# Patient Record
Sex: MALE | Race: ASIAN | NOT HISPANIC OR LATINO | URBAN - METROPOLITAN AREA
[De-identification: names, ages, dates, MRNs, and addresses within clinical notes are randomized per-mention and may not be internally consistent; named-entity substitution may affect disease eponyms.]

---

## 2022-04-25 VITALS
HEART RATE: 68 BPM | SYSTOLIC BLOOD PRESSURE: 127 MMHG | RESPIRATION RATE: 16 BRPM | DIASTOLIC BLOOD PRESSURE: 61 MMHG | OXYGEN SATURATION: 98 %

## 2022-04-25 RX ORDER — CHLORHEXIDINE GLUCONATE 213 G/1000ML
1 SOLUTION TOPICAL ONCE
Refills: 0 | Status: DISCONTINUED | OUTPATIENT
Start: 2022-05-02 | End: 2022-05-02

## 2022-04-25 NOTE — H&P ADULT - HISTORY OF PRESENT ILLNESS
Cardiologist: Dr. Nadege Samuels  Escort:  Pharmacy: [facility] #[id number] [phone]  COVID:     *Verify Meds*    70 yo M with a PMH of HTN, HLD, SVT (on NST), CAD s/p NAVA RCA/pLAD, SSS (no PPM, had chronotropic response), varicose veins s/p EVAT of R GSV who presented to outpatient cardiologist Dr. Samuels with complaints of an intermittent substernal non-radiating chest tightness on exertion of <2 blocks, relieved with rest, worsening over the past few months w/ associated b/l LE pain/discoloration on exertion/rest. Denies SOB, dizziness, diaphoresis, palpitations, orthopnea/PND, BRBPR, hematuria, melena, LE swelling, recent travel/sick contacts/illness. Pt with frequent 2.7 second pauses and PVCs on outpatient Holter monitor. Echocardiogram 8/2/21: LVEF 66%, no significant valvular disease. NST (per MD note): small to moderate ischemia in the anteroseptal locations with short episode of SVT. In light of patient's risk factors, CCS angina class ___ sx and abnormal NST, patient is referred for cardiac catheterization with possible intervention if clinically indicated.  Cardiologist: Dr. Nadege Samuels  Escort: son   Pharmacy: [facility] #[id number] [phone]  COVID: 4/29 neg (patient chart )        70 yo M with a PMH of HTN, HLD, SVT (on NST), CAD s/p NAVA RCA/pLAD, SSS (no PPM, had chronotropic response), varicose veins s/p EVAT of R GSV who presented to outpatient cardiologist Dr. Samuels with complaints of an intermittent substernal non-radiating chest tightness on exertion of <2 blocks, relieved with rest, worsening over the past few months w/ associated b/l LE pain/discoloration on exertion/rest. Denies SOB, dizziness, diaphoresis, palpitations, orthopnea/PND, BRBPR, hematuria, melena, LE swelling, recent travel/sick contacts/illness. Pt with frequent 2.7 second pauses and PVCs on outpatient Holter monitor. Echocardiogram 8/2/21: LVEF 66%, no significant valvular disease. NST (per MD note): small to moderate ischemia in the anteroseptal locations with short episode of SVT. In light of patient's risk factors, CCS angina class 3 sx and abnormal NST, patient is referred for cardiac catheterization with possible intervention if clinically indicated.  Cardiologist: Dr. Nadege Samuels  Escort: son   Pharmacy: [facility] #[id number] [phone]  COVID: 4/29 neg (patient chart )        72 yo M with a PMH of HTN, HLD, SVT (on NST), CAD s/p NAVA RCA/pLAD, SSS (no PPM, had chronotropic response), multiple myleoma, varicose veins s/p EVAT of R GSV who presented to outpatient cardiologist Dr. Samuels with complaints of an intermittent substernal non-radiating chest tightness on exertion of <2 blocks, relieved with rest, worsening over the past few months w/ associated b/l LE pain/discoloration on exertion/rest. Denies SOB, dizziness, diaphoresis, palpitations, orthopnea/PND, BRBPR, hematuria, melena, LE swelling, recent travel/sick contacts/illness. Pt with frequent 2.7 second pauses and PVCs on outpatient Holter monitor. Echocardiogram 8/2/21: LVEF 66%, no significant valvular disease. NST (per MD note): small to moderate ischemia in the anteroseptal locations with short episode of SVT. In light of patient's risk factors, CCS angina class 3 sx and abnormal NST, patient is referred for cardiac catheterization with possible intervention if clinically indicated.

## 2022-04-25 NOTE — H&P ADULT - MS EXT PE MLT D E PC
The patient is a 72y Male complaining of abnormal lab result. no clubbing/no cyanosis/no pedal edema

## 2022-04-25 NOTE — H&P ADULT - NSICDXPASTMEDICALHX_GEN_ALL_CORE_FT
PAST MEDICAL HISTORY:  CAD (coronary artery disease)     H/O varicose veins     HLD (hyperlipidemia)     HTN (hypertension)     SSS (sick sinus syndrome)

## 2022-04-25 NOTE — H&P ADULT - NSHPLABSRESULTS_GEN_ALL_CORE
13.4   8.53  )-----------( 211      ( 02 May 2022 13:33 )             38.6   05-02    x   |  x   |  19  ----------------------------<  112<H>  x    |  x   |  1.08    Ca    9.0      02 May 2022 13:34    PT/INR - ( 02 May 2022 13:34 )   PT: 10.7 sec;   INR: 0.90          PTT - ( 02 May 2022 13:34 )  PTT:26.2 sec

## 2022-04-25 NOTE — H&P ADULT - ASSESSMENT
70 yo M with a PMH of HTN, HLD, SVT (on NST), CAD s/p NAVA RCA/pLAD, SSS (no PPM, had chronotropic response), varicose veins s/p EVAT of R GSV who is referred for cardiac catheterization with possible intervention if clinically indicated 2/2 patient's risk factors, CCS angina class 3 sx and abnormal NST      H/H    . Pt denies bleeding, GI bleeding, hematemesis, hematuria, BRBPR or melena . ASA … and Plavix … pre-cath.  Cr.     IV NS@ cc/hr pre-cath.    Risks & benefits of procedure and alternative therapy have been explained to the patient including but not limited to: allergic reaction, bleeding w/possible need for blood transfusion, infection, renal and vascular compromise, limb damage, arrhythmia, stroke, vessel dissection/perforation, Myocardial infarction, emergent CABG. Informed consent obtained and in chart   70 yo M with a PMH of HTN, HLD, SVT (on NST), CAD s/p NAVA RCA/pLAD, SSS (no PPM, had chronotropic response), multiple myleoma, varicose veins s/p EVAT of R GSV who is referred for cardiac catheterization with possible intervention if clinically indicated 2/2 patient's risk factors, CCS angina class 3 sx and abnormal NST      H/H   13/38  . Pt denies bleeding, GI bleeding, hematemesis, hematuria, BRBPR or melena . Pt. loaded with  mg PO X 1 and Plavix 600 mg PO X 1 pre-cath.  Cr.     IV NS@ 250 cc bolus X 1 to be c/w NS @ 75 cc/hr pre-cath as per hydration protocol.    Risks & benefits of procedure and alternative therapy have been explained to the patient including but not limited to: allergic reaction, bleeding w/possible need for blood transfusion, infection, renal and vascular compromise, limb damage, arrhythmia, stroke, vessel dissection/perforation, Myocardial infarction, emergent CABG. Informed consent obtained and in chart

## 2022-05-02 ENCOUNTER — OUTPATIENT (OUTPATIENT)
Dept: OUTPATIENT SERVICES | Facility: HOSPITAL | Age: 72
LOS: 1 days | Discharge: HOME | End: 2022-05-02
Payer: MEDICARE

## 2022-05-02 LAB
A1C WITH ESTIMATED AVERAGE GLUCOSE RESULT: 5.9 % — HIGH (ref 4–5.6)
ALBUMIN SERPL ELPH-MCNC: 4.3 G/DL — SIGNIFICANT CHANGE UP (ref 3.3–5)
ALP SERPL-CCNC: 85 U/L — SIGNIFICANT CHANGE UP (ref 40–120)
ALT FLD-CCNC: 22 U/L — SIGNIFICANT CHANGE UP (ref 10–45)
ANION GAP SERPL CALC-SCNC: 12 MMOL/L — SIGNIFICANT CHANGE UP (ref 5–17)
ANION GAP SERPL CALC-SCNC: 17 MMOL/L — SIGNIFICANT CHANGE UP (ref 5–17)
APTT BLD: 26.2 SEC — LOW (ref 27.5–35.5)
AST SERPL-CCNC: 16 U/L — SIGNIFICANT CHANGE UP (ref 10–40)
BASOPHILS # BLD AUTO: 0.01 K/UL — SIGNIFICANT CHANGE UP (ref 0–0.2)
BASOPHILS NFR BLD AUTO: 0.1 % — SIGNIFICANT CHANGE UP (ref 0–2)
BILIRUB SERPL-MCNC: 0.9 MG/DL — SIGNIFICANT CHANGE UP (ref 0.2–1.2)
BUN SERPL-MCNC: 16 MG/DL — SIGNIFICANT CHANGE UP (ref 7–23)
BUN SERPL-MCNC: 19 MG/DL — SIGNIFICANT CHANGE UP (ref 7–23)
CALCIUM SERPL-MCNC: 8.1 MG/DL — LOW (ref 8.4–10.5)
CALCIUM SERPL-MCNC: 9 MG/DL — SIGNIFICANT CHANGE UP (ref 8.4–10.5)
CHLORIDE SERPL-SCNC: 108 MMOL/L — SIGNIFICANT CHANGE UP (ref 96–108)
CHLORIDE SERPL-SCNC: 110 MMOL/L — HIGH (ref 96–108)
CHOLEST SERPL-MCNC: 126 MG/DL — SIGNIFICANT CHANGE UP
CK MB CFR SERPL CALC: 3.8 NG/ML — SIGNIFICANT CHANGE UP (ref 0–6.7)
CK SERPL-CCNC: 75 U/L — SIGNIFICANT CHANGE UP (ref 30–200)
CO2 SERPL-SCNC: 21 MMOL/L — LOW (ref 22–31)
CO2 SERPL-SCNC: 22 MMOL/L — SIGNIFICANT CHANGE UP (ref 22–31)
CREAT SERPL-MCNC: 1.08 MG/DL — SIGNIFICANT CHANGE UP (ref 0.5–1.3)
CREAT SERPL-MCNC: 1.15 MG/DL — SIGNIFICANT CHANGE UP (ref 0.5–1.3)
EGFR: 68 ML/MIN/1.73M2 — SIGNIFICANT CHANGE UP
EGFR: 73 ML/MIN/1.73M2 — SIGNIFICANT CHANGE UP
EOSINOPHIL # BLD AUTO: 0 K/UL — SIGNIFICANT CHANGE UP (ref 0–0.5)
EOSINOPHIL NFR BLD AUTO: 0 % — SIGNIFICANT CHANGE UP (ref 0–6)
ESTIMATED AVERAGE GLUCOSE: 123 MG/DL — HIGH (ref 68–114)
GLUCOSE SERPL-MCNC: 112 MG/DL — HIGH (ref 70–99)
GLUCOSE SERPL-MCNC: 199 MG/DL — HIGH (ref 70–99)
HCT VFR BLD CALC: 33.2 % — LOW (ref 39–50)
HCT VFR BLD CALC: 38.6 % — LOW (ref 39–50)
HCV AB S/CO SERPL IA: 0.04 S/CO — SIGNIFICANT CHANGE UP
HCV AB SERPL-IMP: SIGNIFICANT CHANGE UP
HDLC SERPL-MCNC: 38 MG/DL — LOW
HGB BLD-MCNC: 11.6 G/DL — LOW (ref 13–17)
HGB BLD-MCNC: 13.4 G/DL — SIGNIFICANT CHANGE UP (ref 13–17)
IMM GRANULOCYTES NFR BLD AUTO: 0.5 % — SIGNIFICANT CHANGE UP (ref 0–1.5)
INR BLD: 0.9 — SIGNIFICANT CHANGE UP (ref 0.88–1.16)
LIPID PNL WITH DIRECT LDL SERPL: 71 MG/DL — SIGNIFICANT CHANGE UP
LYMPHOCYTES # BLD AUTO: 1.9 K/UL — SIGNIFICANT CHANGE UP (ref 1–3.3)
LYMPHOCYTES # BLD AUTO: 22.3 % — SIGNIFICANT CHANGE UP (ref 13–44)
MCHC RBC-ENTMCNC: 30.9 PG — SIGNIFICANT CHANGE UP (ref 27–34)
MCHC RBC-ENTMCNC: 31 PG — SIGNIFICANT CHANGE UP (ref 27–34)
MCHC RBC-ENTMCNC: 34.7 GM/DL — SIGNIFICANT CHANGE UP (ref 32–36)
MCHC RBC-ENTMCNC: 34.9 GM/DL — SIGNIFICANT CHANGE UP (ref 32–36)
MCV RBC AUTO: 88.3 FL — SIGNIFICANT CHANGE UP (ref 80–100)
MCV RBC AUTO: 89.4 FL — SIGNIFICANT CHANGE UP (ref 80–100)
MONOCYTES # BLD AUTO: 0.7 K/UL — SIGNIFICANT CHANGE UP (ref 0–0.9)
MONOCYTES NFR BLD AUTO: 8.2 % — SIGNIFICANT CHANGE UP (ref 2–14)
NEUTROPHILS # BLD AUTO: 5.88 K/UL — SIGNIFICANT CHANGE UP (ref 1.8–7.4)
NEUTROPHILS NFR BLD AUTO: 68.9 % — SIGNIFICANT CHANGE UP (ref 43–77)
NON HDL CHOLESTEROL: 88 MG/DL — SIGNIFICANT CHANGE UP
NRBC # BLD: 0 /100 WBCS — SIGNIFICANT CHANGE UP (ref 0–0)
NRBC # BLD: 0 /100 WBCS — SIGNIFICANT CHANGE UP (ref 0–0)
PLATELET # BLD AUTO: 174 K/UL — SIGNIFICANT CHANGE UP (ref 150–400)
PLATELET # BLD AUTO: 211 K/UL — SIGNIFICANT CHANGE UP (ref 150–400)
POTASSIUM SERPL-MCNC: 3.5 MMOL/L — SIGNIFICANT CHANGE UP (ref 3.5–5.3)
POTASSIUM SERPL-MCNC: 4 MMOL/L — SIGNIFICANT CHANGE UP (ref 3.5–5.3)
POTASSIUM SERPL-SCNC: 3.5 MMOL/L — SIGNIFICANT CHANGE UP (ref 3.5–5.3)
POTASSIUM SERPL-SCNC: 4 MMOL/L — SIGNIFICANT CHANGE UP (ref 3.5–5.3)
PROT SERPL-MCNC: 6.4 G/DL — SIGNIFICANT CHANGE UP (ref 6–8.3)
PROTHROM AB SERPL-ACNC: 10.7 SEC — SIGNIFICANT CHANGE UP (ref 10.5–13.4)
RBC # BLD: 3.76 M/UL — LOW (ref 4.2–5.8)
RBC # BLD: 4.32 M/UL — SIGNIFICANT CHANGE UP (ref 4.2–5.8)
RBC # FLD: 15.4 % — HIGH (ref 10.3–14.5)
RBC # FLD: 15.4 % — HIGH (ref 10.3–14.5)
SODIUM SERPL-SCNC: 144 MMOL/L — SIGNIFICANT CHANGE UP (ref 135–145)
SODIUM SERPL-SCNC: 146 MMOL/L — HIGH (ref 135–145)
TRIGL SERPL-MCNC: 83 MG/DL — SIGNIFICANT CHANGE UP
WBC # BLD: 5.94 K/UL — SIGNIFICANT CHANGE UP (ref 3.8–10.5)
WBC # BLD: 8.53 K/UL — SIGNIFICANT CHANGE UP (ref 3.8–10.5)
WBC # FLD AUTO: 5.94 K/UL — SIGNIFICANT CHANGE UP (ref 3.8–10.5)
WBC # FLD AUTO: 8.53 K/UL — SIGNIFICANT CHANGE UP (ref 3.8–10.5)

## 2022-05-02 PROCEDURE — 99152 MOD SED SAME PHYS/QHP 5/>YRS: CPT

## 2022-05-02 PROCEDURE — 80053 COMPREHEN METABOLIC PANEL: CPT

## 2022-05-02 PROCEDURE — 82550 ASSAY OF CK (CPK): CPT

## 2022-05-02 PROCEDURE — 82553 CREATINE MB FRACTION: CPT

## 2022-05-02 PROCEDURE — 99153 MOD SED SAME PHYS/QHP EA: CPT

## 2022-05-02 PROCEDURE — 86803 HEPATITIS C AB TEST: CPT

## 2022-05-02 PROCEDURE — 93005 ELECTROCARDIOGRAM TRACING: CPT

## 2022-05-02 PROCEDURE — 83036 HEMOGLOBIN GLYCOSYLATED A1C: CPT

## 2022-05-02 PROCEDURE — 92978 ENDOLUMINL IVUS OCT C 1ST: CPT | Mod: 26,LD

## 2022-05-02 PROCEDURE — C1725: CPT

## 2022-05-02 PROCEDURE — C1874: CPT

## 2022-05-02 PROCEDURE — C1894: CPT

## 2022-05-02 PROCEDURE — C1887: CPT

## 2022-05-02 PROCEDURE — 80048 BASIC METABOLIC PNL TOTAL CA: CPT

## 2022-05-02 PROCEDURE — 93010 ELECTROCARDIOGRAM REPORT: CPT

## 2022-05-02 PROCEDURE — C1753: CPT

## 2022-05-02 PROCEDURE — C1769: CPT

## 2022-05-02 PROCEDURE — 92928 PRQ TCAT PLMT NTRAC ST 1 LES: CPT | Mod: LD

## 2022-05-02 PROCEDURE — 85025 COMPLETE CBC W/AUTO DIFF WBC: CPT

## 2022-05-02 PROCEDURE — C9600: CPT | Mod: LD

## 2022-05-02 PROCEDURE — 85027 COMPLETE CBC AUTOMATED: CPT

## 2022-05-02 PROCEDURE — 85730 THROMBOPLASTIN TIME PARTIAL: CPT

## 2022-05-02 PROCEDURE — 80061 LIPID PANEL: CPT

## 2022-05-02 PROCEDURE — 85610 PROTHROMBIN TIME: CPT

## 2022-05-02 PROCEDURE — 93458 L HRT ARTERY/VENTRICLE ANGIO: CPT | Mod: 26,59

## 2022-05-02 PROCEDURE — 93458 L HRT ARTERY/VENTRICLE ANGIO: CPT | Mod: 59

## 2022-05-02 PROCEDURE — 92978 ENDOLUMINL IVUS OCT C 1ST: CPT | Mod: LD

## 2022-05-02 RX ORDER — SODIUM CHLORIDE 9 MG/ML
1000 INJECTION INTRAMUSCULAR; INTRAVENOUS; SUBCUTANEOUS
Refills: 0 | Status: DISCONTINUED | OUTPATIENT
Start: 2022-05-02 | End: 2022-05-17

## 2022-05-02 RX ORDER — CLOPIDOGREL BISULFATE 75 MG/1
1 TABLET, FILM COATED ORAL
Qty: 30 | Refills: 11
Start: 2022-05-02 | End: 2023-04-26

## 2022-05-02 RX ORDER — ASPIRIN/CALCIUM CARB/MAGNESIUM 324 MG
1 TABLET ORAL
Qty: 30 | Refills: 11
Start: 2022-05-02 | End: 2023-04-26

## 2022-05-02 RX ORDER — OLMESARTAN MEDOXOMIL 5 MG/1
1 TABLET, FILM COATED ORAL
Qty: 0 | Refills: 0 | DISCHARGE

## 2022-05-02 RX ORDER — CLOPIDOGREL BISULFATE 75 MG/1
600 TABLET, FILM COATED ORAL ONCE
Refills: 0 | Status: COMPLETED | OUTPATIENT
Start: 2022-05-02 | End: 2022-05-02

## 2022-05-02 RX ORDER — ASPIRIN/CALCIUM CARB/MAGNESIUM 324 MG
1 TABLET ORAL
Qty: 0 | Refills: 0 | DISCHARGE

## 2022-05-02 RX ORDER — ASPIRIN/CALCIUM CARB/MAGNESIUM 324 MG
243 TABLET ORAL ONCE
Refills: 0 | Status: COMPLETED | OUTPATIENT
Start: 2022-05-02 | End: 2022-05-02

## 2022-05-02 RX ORDER — SODIUM CHLORIDE 9 MG/ML
250 INJECTION INTRAMUSCULAR; INTRAVENOUS; SUBCUTANEOUS ONCE
Refills: 0 | Status: COMPLETED | OUTPATIENT
Start: 2022-05-02 | End: 2022-05-02

## 2022-05-02 RX ORDER — POTASSIUM CHLORIDE 20 MEQ
40 PACKET (EA) ORAL ONCE
Refills: 0 | Status: COMPLETED | OUTPATIENT
Start: 2022-05-02 | End: 2022-05-02

## 2022-05-02 RX ORDER — SODIUM CHLORIDE 9 MG/ML
500 INJECTION INTRAMUSCULAR; INTRAVENOUS; SUBCUTANEOUS
Refills: 0 | Status: DISCONTINUED | OUTPATIENT
Start: 2022-05-02 | End: 2022-05-02

## 2022-05-02 RX ADMIN — SODIUM CHLORIDE 75 MILLILITER(S): 9 INJECTION INTRAMUSCULAR; INTRAVENOUS; SUBCUTANEOUS at 14:10

## 2022-05-02 RX ADMIN — SODIUM CHLORIDE 205 MILLILITER(S): 9 INJECTION INTRAMUSCULAR; INTRAVENOUS; SUBCUTANEOUS at 17:10

## 2022-05-02 RX ADMIN — Medication 243 MILLIGRAM(S): at 14:25

## 2022-05-02 RX ADMIN — Medication 40 MILLIEQUIVALENT(S): at 14:26

## 2022-05-02 RX ADMIN — SODIUM CHLORIDE 500 MILLILITER(S): 9 INJECTION INTRAMUSCULAR; INTRAVENOUS; SUBCUTANEOUS at 14:10

## 2022-05-02 RX ADMIN — CLOPIDOGREL BISULFATE 600 MILLIGRAM(S): 75 TABLET, FILM COATED ORAL at 14:25

## 2022-05-02 NOTE — PROGRESS NOTE ADULT - SUBJECTIVE AND OBJECTIVE BOX
Interventional Cardiology PA Post PCI SDA Discharge Note      Patient without complaints. Ambulated and voided without difficulties    Ext: Right Radial: no hematoma, no bleeding, dressing; C/D/I          Pulses: intact RAD/DP/PT to baseline     A/P: 70 yo M with a PMH of HTN, HLD, SVT (on NST), CAD s/p NAVA RCA/pLAD, SSS (no PPM, had chronotropic response), multiple myleoma, varicose veins s/p EVAT of R GSV who is referred for cardiac catheterization with possible intervention if clinically indicated 2/2 patient's risk factors, CCS angina class 3 sx and abnormal NST.  Pt now s/p cardiac cath 5/2/22: NAVA x 2 pLAD, access R radial TR band applied.    1. Follow-up with PMD/Cardiologist Abril in 72 hours.  2. Post procedure labs/EKG reviewed and stable.    3. Pt given instructions on importance of taking antiplatelet medication.    4. Stable for discharge as per attending Dr. Vogt after bed rest, pt voids, wrist stable and 30 minutes of ambulation.  5. Prescriptions for Aspirin and Plavix e-prescribed and submitted to patient's pharmacy.  6. Patient will continue Ranexa 500mg BID, Amlodipine/Atorvastatin 10mg/10mg QD, Olmesartan 40mg QD, Omeprazole 20mg QD.   Interventional Cardiology PA Post PCI SDA Discharge Note      Patient without complaints. Ambulated and voided without difficulties    Ext: Right Radial: no hematoma, no bleeding, dressing; C/D/I          Pulses: intact RAD/DP/PT to baseline     A/P: 72 yo M with a PMH of HTN, HLD, SVT (on NST), CAD s/p NAVA RCA/pLAD, SSS (no PPM, had chronotropic response), multiple myleoma, varicose veins s/p EVAT of R GSV who is referred for cardiac catheterization with possible intervention if clinically indicated 2/2 patient's risk factors, CCS angina class 3 sx and abnormal NST.   Pt now s/p cardiac cath 5/2/22: Shockwave/NAVA x 2 pLAD (75% ISR Ca+); LM normal, dLCx 30-40%, pRCA 20-30%, dRCA stent patent, RPDA stent patent, oRPDA 50%, EDP 12, access R radial TR band applied.    1. Follow-up with PMD/Cardiologist Abril in 72 hours.  2. Post procedure labs/EKG reviewed and stable.    3. Pt given instructions on importance of taking antiplatelet medication.    4. Stable for discharge as per attending Dr. Vogt after bed rest, pt voids, wrist stable and 30 minutes of ambulation.  5. Prescriptions for Aspirin and Plavix e-prescribed and submitted to patient's pharmacy.  6. Patient will continue Ranexa 500mg BID, Amlodipine/Atorvastatin 10mg/10mg QD, Olmesartan 40mg QD, Omeprazole 20mg QD.

## 2022-05-10 DIAGNOSIS — T82.855A STENOSIS OF CORONARY ARTERY STENT, INITIAL ENCOUNTER: ICD-10-CM

## 2022-05-10 DIAGNOSIS — I25.10 ATHEROSCLEROTIC HEART DISEASE OF NATIVE CORONARY ARTERY WITHOUT ANGINA PECTORIS: ICD-10-CM

## 2022-05-10 DIAGNOSIS — I25.84 CORONARY ATHEROSCLEROSIS DUE TO CALCIFIED CORONARY LESION: ICD-10-CM

## 2022-05-10 DIAGNOSIS — R94.39 ABNORMAL RESULT OF OTHER CARDIOVASCULAR FUNCTION STUDY: ICD-10-CM

## 2023-05-24 PROBLEM — I25.10 ATHEROSCLEROTIC HEART DISEASE OF NATIVE CORONARY ARTERY WITHOUT ANGINA PECTORIS: Chronic | Status: ACTIVE | Noted: 2022-04-25

## 2023-05-24 PROBLEM — Z86.79 PERSONAL HISTORY OF OTHER DISEASES OF THE CIRCULATORY SYSTEM: Chronic | Status: ACTIVE | Noted: 2022-04-25

## 2023-05-24 PROBLEM — I49.5 SICK SINUS SYNDROME: Chronic | Status: ACTIVE | Noted: 2022-04-25

## 2023-05-24 PROBLEM — E78.5 HYPERLIPIDEMIA, UNSPECIFIED: Chronic | Status: ACTIVE | Noted: 2022-04-25

## 2023-05-24 PROBLEM — I10 ESSENTIAL (PRIMARY) HYPERTENSION: Chronic | Status: ACTIVE | Noted: 2022-04-25

## 2023-05-30 VITALS
OXYGEN SATURATION: 98 % | TEMPERATURE: 98 F | RESPIRATION RATE: 16 BRPM | WEIGHT: 154.98 LBS | DIASTOLIC BLOOD PRESSURE: 71 MMHG | HEART RATE: 49 BPM | SYSTOLIC BLOOD PRESSURE: 131 MMHG | HEIGHT: 64 IN

## 2023-05-30 RX ORDER — CHLORHEXIDINE GLUCONATE 213 G/1000ML
1 SOLUTION TOPICAL ONCE
Refills: 0 | Status: DISCONTINUED | OUTPATIENT
Start: 2023-06-05 | End: 2023-06-20

## 2023-05-30 RX ORDER — AMLODIPINE AND ATORVASTATIN 5; 20 MG/1; MG/1
1 TABLET, FILM COATED ORAL
Qty: 0 | Refills: 0 | DISCHARGE

## 2023-05-30 RX ORDER — ASPIRIN/CALCIUM CARB/MAGNESIUM 324 MG
0 TABLET ORAL
Qty: 0 | Refills: 0 | DISCHARGE

## 2023-05-30 RX ORDER — PREGABALIN 225 MG/1
0 CAPSULE ORAL
Qty: 0 | Refills: 0 | DISCHARGE

## 2023-05-30 RX ORDER — DEXAMETHASONE 0.5 MG/5ML
0 ELIXIR ORAL
Qty: 0 | Refills: 0 | DISCHARGE

## 2023-05-30 RX ORDER — LENALIDOMIDE 5 MG/1
1 CAPSULE ORAL
Qty: 0 | Refills: 0 | DISCHARGE

## 2023-05-30 RX ORDER — RANOLAZINE 500 MG/1
1 TABLET, FILM COATED, EXTENDED RELEASE ORAL
Qty: 0 | Refills: 0 | DISCHARGE

## 2023-05-30 RX ORDER — CHONDROITIN SULFATE A SODIUM 100 %
1200 POWDER (GRAM) MISCELLANEOUS
Qty: 0 | Refills: 0 | DISCHARGE

## 2023-05-30 RX ORDER — NITROGLYCERIN 6.5 MG
1 CAPSULE, EXTENDED RELEASE ORAL
Qty: 0 | Refills: 0 | DISCHARGE

## 2023-05-30 RX ORDER — ASCORBIC ACID 60 MG
1 TABLET,CHEWABLE ORAL
Qty: 0 | Refills: 0 | DISCHARGE

## 2023-05-30 NOTE — H&P ADULT - NSICDXPASTMEDICALHX_GEN_ALL_CORE_FT
PAST MEDICAL HISTORY:  CAD (coronary artery disease)     H/O varicose veins     HLD (hyperlipidemia)     HTN (hypertension)     Multiple myeloma     SSS (sick sinus syndrome)

## 2023-05-30 NOTE — H&P ADULT - HISTORY OF PRESENT ILLNESS
Cardiologist - Dr. Nadege Samuels  Pharmacy -  [facility] -    72M, __ speaking, w/ PMHx of HTN, HLD, CAD s/p multiple PCIs (most recently 5/2022 w/ NAVA x 2 pLAD and patent dRCA and RPDA stents), h/o SSS w/ chronotropic responce (no PPM), multiple myeloma (____), varicose veins s/p EVAT of R GSV and _______, initially presented to outpt cardiologist Dr. Samuels c/o       Cardiac Cath 5/2/22: Shockwave/NAVA x 2 pLAD (75% ISR); LM normal, dLCx 30-40%, pRCA 20-30%, dRCA stent patent, RPDA stent patent, oRPDA 50%, EDP 12, access R radial. Cardiologist - Dr. Nadege Samuels  Pharmacy - [facility] ([phone])  Escort -    72M, __ speaking, w/ PMHx of HTN, HLD, CAD s/p multiple PCIs (most recently 5/2022 w/ NAVA x 2 pLAD and patent dRCA and RPDA stents), h/o SSS w/ chronotropic response (no PPM), multiple myeloma (____), and varicose veins s/p EVAT of R GSV, initially presented to outpt cardiologist Dr. Samuels c/o persistent TOM despite stopping __ x 2months (confirm which oncology med).Pt currently walking 3mph for 30min but w/ significant change from prior. He also endorses shoulder discomfort. __. Pt denies any ___ CP, palpitations, dizziness, syncope, diaphoresis, fatigue, LE edema, orthopnea, PND, N/V, fever, chills or recent sick contact.     Cardiac Cath 5/2/22: Shockwave/NAVA x 2 pLAD (75% ISR); LM normal, dLCx 30-40%, pRCA 20-30%, dRCA stent patent, RPDA stent patent, oRPDA 50%, EDP 12, access R radial. Cardiologist - Dr. Nadege Samuels  Pharmacy - [facility] ([phone])  Escort -    72M, __ speaking, w/ PMHx of HTN, HLD, CAD s/p multiple PCIs (most recently 5/2022 w/ NAVA x 2 pLAD and patent dRCA and RPDA stents), h/o SSS w/ chronotropic response (no PPM), Multiple Myeloma (on Ninlaro), and varicose veins s/p EVAT of R GSV, initially presented to outpt cardiologist Dr. Samuels c/o persistent TOM despite stopping Ninlaro x 2months, as well as trialing Lasix x 3 days w/o improvement. Pt currently walking 3mph for 30min but w/ significant change from prior. He also endorses intermittent, mild substernal CP, described as dull ache, that occurs w/ mod exertion, lasting a few months and improving w/ rest. Pt denies any ___ palpitations, dizziness, syncope, diaphoresis, fatigue, LE edema, orthopnea, PND, N/V, fever, chills or recent sick contact.     NST 3/26/23: small-mod ischemia in anterior region which was reversible w/ rest, LVEF 53% w/o WMA. TTE 3/13/23: LVEF 64% w/o WMA, mildly dilated LA, G1DD, mild MR/TR, mild PHTN (PASP 42mmHg). Cardiac Cath 5/2/22: Shockwave/NAVA x 2 pLAD (75% ISR); LM normal, dLCx 30-40%, pRCA 20-30%, dRCA stent patent, RPDA stent patent, oRPDA 50%, EDP 12, access R radial. Cardiologist - Dr. Nadege Samuels  Pharmacy - [facility] ([phone])  Escort -    72M, __ speaking, w/ PMHx of HTN, HLD, CAD s/p multiple PCIs (most recently 5/2022 w/ NAVA x 2 pLAD and patent dRCA and RPDA stents), h/o SSS w/ chronotropic response (no PPM), Multiple Myeloma (on Ninlaro), and varicose veins s/p EVAT of R GSV, initially presented to outpt cardiologist Dr. Samuels c/o persistent TOM despite stopping Ninlaro x 2months, as well as trialing Lasix x 3 days w/o improvement. Pt currently walking 3mph for 30min but w/ significant change from prior. He also endorses intermittent, mild substernal CP, described as dull ache, that occurs w/ mod exertion, lasting a few months and improving w/ rest. Pt denies any ___ palpitations, dizziness, syncope, diaphoresis, fatigue, LE edema, orthopnea, PND, N/V, fever, chills or recent sick contact.     NST 3/26/23: small-mod ischemia in anterior region which was reversible w/ rest, LVEF 53% w/o WMA. TTE 3/13/23: LVEF 64% w/o WMA, mildly dilated LA, G1DD, mild MR/TR, mild PHTN (PASP 42mmHg). Cardiac Cath 5/2/22: Shockwave/NAVA x 2 pLAD (75% ISR); LM normal, dLCx 30-40%, pRCA 20-30%, dRCA stent patent, RPDA stent patent, oRPDA 50%, EDP 12, access R radial.    In light of pts risk factors, CCS class III anginal symptoms and abnormal NST, pt now presents to [facility] for recommended cardiac catheterization with possible intervention if clinically indicated.  Cardiologist - Dr. Nadege Samuels  Pharmacy - [facility] [phone]  Escort - Son  Language Line Irish #[id number]     72M Irish speaking PMHx of HTN, HLD, CAD s/p multiple PCIs (most recently 5/2022 w/ NAVA x 2 pLAD and patent dRCA and RPDA stents), h/o SSS w/ chronotropic response (no PPM), Multiple Myeloma (previously on Ninlaro/Pomalyst), and varicose veins s/p EVAT of R GSV, initially presented to outpt cardiologist Dr. Samuels c/o persistent TOM despite stopping Ninlaro/Pomalyst   x 1 month. Patient has trialed Lasix w/o improvement . Pt currently walking 3mph for 30min but w/ significant change from prior. He also endorses intermittent, mild substernal CP, described as dull ache, that occurs w/ mod exertion, lasting a few months and improving w/ rest. Pt denies any  palpitations, dizziness, syncope, diaphoresis, fatigue, LE edema, orthopnea, PND, N/V, fever, chills or recent sick contact.     NST 3/26/23: small-mod ischemia in anterior region which was reversible w/ rest, LVEF 53% w/o WMA. TTE 3/13/23: LVEF 64% w/o WMA, mildly dilated LA, G1DD, mild MR/TR, mild PHTN (PASP 42mmHg). Cardiac Cath 5/2/22: Shockwave/NAVA x 2 pLAD (75% ISR); LM normal, dLCx 30-40%, pRCA 20-30%, dRCA stent patent, RPDA stent patent, oRPDA 50%, EDP 12, access R radial.    In light of pts risk factors, CCS class III anginal symptoms and abnormal NST, pt now presents to [facility] for recommended cardiac catheterization with possible intervention if clinically indicated.

## 2023-05-30 NOTE — H&P ADULT - ASSESSMENT
72M Montserratian speaking PMHx of HTN, HLD, CAD s/p multiple PCIs (most recently 5/2022 w/ NAVA x 2 pLAD and patent dRCA and RPDA stents), h/o SSS w/ chronotropic response (no PPM), Multiple Myeloma (previously on Ninlaro/Pomalyst), and varicose veins s/p EVAT of R GSV presents to [facility] for recommended cardiac catheterization with possible intervention if clinically indicated in light of patients risk factors, CCS class III anginal symptoms, and abnormal NST       ASA III          Mallampati II    Patient is a candidate for sedation: yes  Sedation: Moderate    Risks & benefits of procedure and alternative therapy have been explained to the patient including but not limited to: allergic reaction, bleeding w/possible need for blood transfusion, infection, renal and vascular compromise, limb damage, arrhythmia, stroke, vessel dissection/perforation, Myocardial infarction, emergent CABG. Informed consent obtained and in chart.           [] H/H 13.7/39.4 Plt 187. Patient denies bleeding, BRBPR, melena, hematuria, hematemesis.  - Patient compliant with ASA/Plavix at home, took this AM. No load needed  [] BUN/Cr 11/1.15 Patient breathing comfortably on RA, lungs clear however w/ B/L 2+ pitting edema to shins   - Hydrate with NS 75 cc/hr x 2 hrs per Hydration Protocol, will defer fluid bolus at this time.   [] K 3.6 Mg 1.8   - Replete w/ KCl 40mEq and MagOx 400mg 72M Afghan speaking PMHx of HTN, HLD, CAD s/p multiple PCIs (most recently 5/2022 w/ NAVA x 2 pLAD and patent dRCA and RPDA stents), h/o SSS w/ chronotropic response (no PPM), Multiple Myeloma (previously on Ninlaro/Pomalyst), and varicose veins s/p EVAT of R GSV presents to [facility] for recommended cardiac catheterization with possible intervention if clinically indicated in light of patients risk factors, CCS class III anginal symptoms, and abnormal NST       ASA III          Mallampati II    Patient is a candidate for sedation: yes  Sedation: Moderate    Risks & benefits of procedure and alternative therapy have been explained to the patient including but not limited to: allergic reaction, bleeding w/possible need for blood transfusion, infection, renal and vascular compromise, limb damage, arrhythmia, stroke, vessel dissection/perforation, Myocardial infarction, emergent CABG. Informed consent obtained and in chart.         Consent obtained w/ Afghan  #[id number]    [] H/H 13.7/39.4 Plt 187. Patient denies bleeding, BRBPR, melena, hematuria, hematemesis.  - Patient compliant with ASA/Plavix at home, took this AM. No load needed  [] BUN/Cr 11/1.15 Patient breathing comfortably on RA, lungs clear however w/ B/L 2+ pitting edema to shins   - Hydrate with NS 75 cc/hr x 2 hrs per Hydration Protocol, will defer fluid bolus at this time.   [] K 3.6 Mg 1.8   - Replete w/ KCl 40mEq and MagOx 400mg

## 2023-05-30 NOTE — H&P ADULT - NSHPLABSRESULTS_GEN_ALL_CORE
13.7   6.17  )-----------( 187      ( 05 Jun 2023 12:44 )             39.4       06-05    142  |  105  |  11  ----------------------------<  92  3.6   |  27  |  1.15    Ca    9.1      05 Jun 2023 12:44  Mg     1.8     06-05    TPro  7.1  /  Alb  4.3  /  TBili  0.9  /  DBili  x   /  AST  23  /  ALT  14  /  AlkPhos  74  06-05      PT/INR - ( 05 Jun 2023 12:44 )   PT: 11.3 sec;   INR: 0.95          PTT - ( 05 Jun 2023 12:44 )  PTT:29.3 sec    CARDIAC MARKERS ( 05 Jun 2023 12:44 )  x     / x     / 171 U/L / x     / 2.7 ng/mL            EKG 6/5/23: 60 BPM NSR w/ PVCs no ischemic changes

## 2023-06-05 ENCOUNTER — OUTPATIENT (OUTPATIENT)
Dept: OUTPATIENT SERVICES | Facility: HOSPITAL | Age: 73
LOS: 1 days | Discharge: HOME | End: 2023-06-05
Payer: MEDICARE

## 2023-06-05 LAB
A1C WITH ESTIMATED AVERAGE GLUCOSE RESULT: 6.1 % — HIGH (ref 4–5.6)
ALBUMIN SERPL ELPH-MCNC: 4.3 G/DL — SIGNIFICANT CHANGE UP (ref 3.3–5)
ALP SERPL-CCNC: 74 U/L — SIGNIFICANT CHANGE UP (ref 40–120)
ALT FLD-CCNC: 14 U/L — SIGNIFICANT CHANGE UP (ref 10–45)
ANION GAP SERPL CALC-SCNC: 10 MMOL/L — SIGNIFICANT CHANGE UP (ref 5–17)
APTT BLD: 29.3 SEC — SIGNIFICANT CHANGE UP (ref 27.5–35.5)
AST SERPL-CCNC: 23 U/L — SIGNIFICANT CHANGE UP (ref 10–40)
BASOPHILS # BLD AUTO: 0.09 K/UL — SIGNIFICANT CHANGE UP (ref 0–0.2)
BASOPHILS NFR BLD AUTO: 1.5 % — SIGNIFICANT CHANGE UP (ref 0–2)
BILIRUB SERPL-MCNC: 0.9 MG/DL — SIGNIFICANT CHANGE UP (ref 0.2–1.2)
BUN SERPL-MCNC: 11 MG/DL — SIGNIFICANT CHANGE UP (ref 7–23)
CALCIUM SERPL-MCNC: 9.1 MG/DL — SIGNIFICANT CHANGE UP (ref 8.4–10.5)
CHLORIDE SERPL-SCNC: 105 MMOL/L — SIGNIFICANT CHANGE UP (ref 96–108)
CHOLEST SERPL-MCNC: 119 MG/DL — SIGNIFICANT CHANGE UP
CK MB CFR SERPL CALC: 2.7 NG/ML — SIGNIFICANT CHANGE UP (ref 0–6.7)
CK SERPL-CCNC: 171 U/L — SIGNIFICANT CHANGE UP (ref 30–200)
CO2 SERPL-SCNC: 27 MMOL/L — SIGNIFICANT CHANGE UP (ref 22–31)
CREAT SERPL-MCNC: 1.15 MG/DL — SIGNIFICANT CHANGE UP (ref 0.5–1.3)
EGFR: 67 ML/MIN/1.73M2 — SIGNIFICANT CHANGE UP
EOSINOPHIL # BLD AUTO: 0.21 K/UL — SIGNIFICANT CHANGE UP (ref 0–0.5)
EOSINOPHIL NFR BLD AUTO: 3.4 % — SIGNIFICANT CHANGE UP (ref 0–6)
ESTIMATED AVERAGE GLUCOSE: 128 MG/DL — HIGH (ref 68–114)
GLUCOSE SERPL-MCNC: 92 MG/DL — SIGNIFICANT CHANGE UP (ref 70–99)
HCT VFR BLD CALC: 39.4 % — SIGNIFICANT CHANGE UP (ref 39–50)
HDLC SERPL-MCNC: 29 MG/DL — LOW
HGB BLD-MCNC: 13.7 G/DL — SIGNIFICANT CHANGE UP (ref 13–17)
IMM GRANULOCYTES NFR BLD AUTO: 0.3 % — SIGNIFICANT CHANGE UP (ref 0–0.9)
INR BLD: 0.95 — SIGNIFICANT CHANGE UP (ref 0.88–1.16)
LIPID PNL WITH DIRECT LDL SERPL: 45 MG/DL — SIGNIFICANT CHANGE UP
LYMPHOCYTES # BLD AUTO: 2.75 K/UL — SIGNIFICANT CHANGE UP (ref 1–3.3)
LYMPHOCYTES # BLD AUTO: 44.6 % — HIGH (ref 13–44)
MAGNESIUM SERPL-MCNC: 1.8 MG/DL — SIGNIFICANT CHANGE UP (ref 1.6–2.6)
MCHC RBC-ENTMCNC: 30.1 PG — SIGNIFICANT CHANGE UP (ref 27–34)
MCHC RBC-ENTMCNC: 34.8 GM/DL — SIGNIFICANT CHANGE UP (ref 32–36)
MCV RBC AUTO: 86.6 FL — SIGNIFICANT CHANGE UP (ref 80–100)
MONOCYTES # BLD AUTO: 0.82 K/UL — SIGNIFICANT CHANGE UP (ref 0–0.9)
MONOCYTES NFR BLD AUTO: 13.3 % — SIGNIFICANT CHANGE UP (ref 2–14)
NEUTROPHILS # BLD AUTO: 2.28 K/UL — SIGNIFICANT CHANGE UP (ref 1.8–7.4)
NEUTROPHILS NFR BLD AUTO: 36.9 % — LOW (ref 43–77)
NON HDL CHOLESTEROL: 90 MG/DL — SIGNIFICANT CHANGE UP
NRBC # BLD: 0 /100 WBCS — SIGNIFICANT CHANGE UP (ref 0–0)
PLATELET # BLD AUTO: 187 K/UL — SIGNIFICANT CHANGE UP (ref 150–400)
POTASSIUM SERPL-MCNC: 3.6 MMOL/L — SIGNIFICANT CHANGE UP (ref 3.5–5.3)
POTASSIUM SERPL-SCNC: 3.6 MMOL/L — SIGNIFICANT CHANGE UP (ref 3.5–5.3)
PROT SERPL-MCNC: 7.1 G/DL — SIGNIFICANT CHANGE UP (ref 6–8.3)
PROTHROM AB SERPL-ACNC: 11.3 SEC — SIGNIFICANT CHANGE UP (ref 10.5–13.4)
RBC # BLD: 4.55 M/UL — SIGNIFICANT CHANGE UP (ref 4.2–5.8)
RBC # FLD: 15.5 % — HIGH (ref 10.3–14.5)
SODIUM SERPL-SCNC: 142 MMOL/L — SIGNIFICANT CHANGE UP (ref 135–145)
TRIGL SERPL-MCNC: 224 MG/DL — HIGH
WBC # BLD: 6.17 K/UL — SIGNIFICANT CHANGE UP (ref 3.8–10.5)
WBC # FLD AUTO: 6.17 K/UL — SIGNIFICANT CHANGE UP (ref 3.8–10.5)

## 2023-06-05 PROCEDURE — 85025 COMPLETE CBC W/AUTO DIFF WBC: CPT

## 2023-06-05 PROCEDURE — 80061 LIPID PANEL: CPT

## 2023-06-05 PROCEDURE — 85610 PROTHROMBIN TIME: CPT

## 2023-06-05 PROCEDURE — 93454 CORONARY ARTERY ANGIO S&I: CPT | Mod: 26

## 2023-06-05 PROCEDURE — 93005 ELECTROCARDIOGRAM TRACING: CPT

## 2023-06-05 PROCEDURE — 82553 CREATINE MB FRACTION: CPT

## 2023-06-05 PROCEDURE — 80053 COMPREHEN METABOLIC PANEL: CPT

## 2023-06-05 PROCEDURE — 93010 ELECTROCARDIOGRAM REPORT: CPT

## 2023-06-05 PROCEDURE — C1769: CPT

## 2023-06-05 PROCEDURE — 83036 HEMOGLOBIN GLYCOSYLATED A1C: CPT

## 2023-06-05 PROCEDURE — 82550 ASSAY OF CK (CPK): CPT

## 2023-06-05 PROCEDURE — 83735 ASSAY OF MAGNESIUM: CPT

## 2023-06-05 PROCEDURE — 85730 THROMBOPLASTIN TIME PARTIAL: CPT

## 2023-06-05 PROCEDURE — C1887: CPT

## 2023-06-05 PROCEDURE — 93454 CORONARY ARTERY ANGIO S&I: CPT

## 2023-06-05 PROCEDURE — C1894: CPT

## 2023-06-05 RX ORDER — AMLODIPINE BESYLATE 2.5 MG/1
1 TABLET ORAL
Refills: 0 | DISCHARGE

## 2023-06-05 RX ORDER — MIRABEGRON 50 MG/1
1 TABLET, EXTENDED RELEASE ORAL
Qty: 0 | Refills: 0 | DISCHARGE

## 2023-06-05 RX ORDER — SODIUM CHLORIDE 9 MG/ML
500 INJECTION INTRAMUSCULAR; INTRAVENOUS; SUBCUTANEOUS
Refills: 0 | Status: DISCONTINUED | OUTPATIENT
Start: 2023-06-05 | End: 2023-06-05

## 2023-06-05 RX ORDER — POMALIDOMIDE 1 MG/1
1 CAPSULE ORAL
Refills: 0 | DISCHARGE

## 2023-06-05 RX ORDER — SODIUM CHLORIDE 9 MG/ML
1000 INJECTION INTRAMUSCULAR; INTRAVENOUS; SUBCUTANEOUS
Refills: 0 | Status: DISCONTINUED | OUTPATIENT
Start: 2023-06-05 | End: 2023-06-20

## 2023-06-05 RX ORDER — DEXAMETHASONE 0.5 MG/5ML
1 ELIXIR ORAL
Refills: 0 | DISCHARGE

## 2023-06-05 RX ORDER — IXAZOMIB 3 MG/1
1 CAPSULE ORAL
Refills: 0 | DISCHARGE

## 2023-06-05 RX ORDER — POTASSIUM CHLORIDE 20 MEQ
40 PACKET (EA) ORAL ONCE
Refills: 0 | Status: COMPLETED | OUTPATIENT
Start: 2023-06-05 | End: 2023-06-05

## 2023-06-05 RX ORDER — DEXAMETHASONE 0.5 MG/5ML
2 ELIXIR ORAL
Refills: 0 | DISCHARGE

## 2023-06-05 RX ORDER — OLMESARTAN MEDOXOMIL 5 MG/1
1 TABLET, FILM COATED ORAL
Qty: 0 | Refills: 0 | DISCHARGE

## 2023-06-05 RX ORDER — FUROSEMIDE 40 MG
1 TABLET ORAL
Refills: 0 | DISCHARGE

## 2023-06-05 RX ORDER — ATORVASTATIN CALCIUM 80 MG/1
1 TABLET, FILM COATED ORAL
Refills: 0 | DISCHARGE

## 2023-06-05 RX ORDER — OMEPRAZOLE 10 MG/1
1 CAPSULE, DELAYED RELEASE ORAL
Qty: 0 | Refills: 0 | DISCHARGE

## 2023-06-05 RX ORDER — MAGNESIUM OXIDE 400 MG ORAL TABLET 241.3 MG
400 TABLET ORAL ONCE
Refills: 0 | Status: COMPLETED | OUTPATIENT
Start: 2023-06-05 | End: 2023-06-05

## 2023-06-05 RX ORDER — SILODOSIN 4 MG/1
1 CAPSULE ORAL
Refills: 0 | DISCHARGE

## 2023-06-05 RX ADMIN — MAGNESIUM OXIDE 400 MG ORAL TABLET 400 MILLIGRAM(S): 241.3 TABLET ORAL at 14:44

## 2023-06-05 RX ADMIN — SODIUM CHLORIDE 200 MILLILITER(S): 9 INJECTION INTRAMUSCULAR; INTRAVENOUS; SUBCUTANEOUS at 17:42

## 2023-06-05 RX ADMIN — SODIUM CHLORIDE 75 MILLILITER(S): 9 INJECTION INTRAMUSCULAR; INTRAVENOUS; SUBCUTANEOUS at 14:44

## 2023-06-05 RX ADMIN — Medication 40 MILLIEQUIVALENT(S): at 14:44

## 2023-06-05 NOTE — PROGRESS NOTE ADULT - SUBJECTIVE AND OBJECTIVE BOX
Interventional Cardiology PA SDA Discharge Note    Patient without complaints. Ambulated and voided without difficulties    Afebrile, VSS    Ext:    		Right Radial :    hematoma,     bleeding, dressing; C/D/I      Pulses:    intact RAD 2+      A/P:  72M [place] Islander speaking PMHx of HTN, HLD, CAD s/p multiple PCIs (most recently 5/2022 w/ NAVA x 2 pLAD and patent dRCA and RPDA stents), h/o SSS w/ chronotropic response (no PPM), Multiple Myeloma (previously on Ninlaro/Pomalyst), and varicose veins s/p EVAT of R GSV presents to [facility] for recommended cardiac catheterization with possible intervention if clinically indicated in light of patients risk factors, CCS class III anginal symptoms, and abnormal NST     s/p cardiac cath (Dr Vogt) RRA:  LM - normal, patent pLAD stent, Lcx LI, large dominant RCA ectatic, dRCA stent patent  non obstructive CAD - medical management    1.	Stable for discharge as per attending Dr. Vogt  after bed rest, wrist stable and 30 minutes of ambulation.  2.	Follow-up with PMD/Cardiologist Nadege Mancini  in 1-2 weeks  3.	Discharged forms signed and copies in chart      Interventional Cardiology PA SDA Discharge Note    Patient without complaints. Ambulated and voided without difficulties    Afebrile, VSS    Ext:    		Right Radial :    hematoma,     bleeding, dressing; C/D/I      Pulses:    intact RAD 2+      A/P:  72M Ethiopian speaking PMHx of HTN, HLD, CAD s/p multiple PCIs (most recently 5/2022 w/ NAVA x 2 pLAD and patent dRCA and RPDA stents), h/o SSS w/ chronotropic response (no PPM), Multiple Myeloma (previously on Ninlaro/Pomalyst), and varicose veins s/p EVAT of R GSV presents to [facility] for recommended cardiac catheterization with possible intervention if clinically indicated in light of patients risk factors, CCS class III anginal symptoms, and abnormal NST     s/p cardiac cath (Dr Vogt) RRA:  LM - normal, patent pLAD stent, Lcx LI, large dominant RCA ectatic, dRCA stent patent and RPDA stent patent  non obstructive CAD - medical management    1.	Stable for discharge as per attending Dr. Vogt  after bed rest, wrist stable and 30 minutes of ambulation.  2.	Follow-up with PMD/Cardiologist Nadege Mancini  in 1-2 weeks  3.	Discharged forms signed and copies in chart

## 2023-06-06 DIAGNOSIS — Z95.5 PRESENCE OF CORONARY ANGIOPLASTY IMPLANT AND GRAFT: ICD-10-CM

## 2023-06-06 DIAGNOSIS — R94.39 ABNORMAL RESULT OF OTHER CARDIOVASCULAR FUNCTION STUDY: ICD-10-CM

## 2023-06-06 DIAGNOSIS — I25.110 ATHEROSCLEROTIC HEART DISEASE OF NATIVE CORONARY ARTERY WITH UNSTABLE ANGINA PECTORIS: ICD-10-CM
